# Patient Record
Sex: FEMALE | Race: WHITE | NOT HISPANIC OR LATINO | URBAN - METROPOLITAN AREA
[De-identification: names, ages, dates, MRNs, and addresses within clinical notes are randomized per-mention and may not be internally consistent; named-entity substitution may affect disease eponyms.]

---

## 2023-09-14 ENCOUNTER — TELEPHONE (OUTPATIENT)
Dept: GASTROENTEROLOGY | Facility: CLINIC | Age: 43
End: 2023-09-14

## 2023-09-14 ENCOUNTER — OFFICE VISIT (OUTPATIENT)
Dept: GASTROENTEROLOGY | Facility: CLINIC | Age: 43
End: 2023-09-14
Payer: COMMERCIAL

## 2023-09-14 VITALS
HEIGHT: 64 IN | WEIGHT: 190 LBS | DIASTOLIC BLOOD PRESSURE: 80 MMHG | BODY MASS INDEX: 32.44 KG/M2 | SYSTOLIC BLOOD PRESSURE: 120 MMHG

## 2023-09-14 DIAGNOSIS — K21.9 GASTROESOPHAGEAL REFLUX DISEASE WITHOUT ESOPHAGITIS: ICD-10-CM

## 2023-09-14 DIAGNOSIS — Z12.11 COLON CANCER SCREENING: Primary | ICD-10-CM

## 2023-09-14 DIAGNOSIS — K64.2 PROLAPSED INTERNAL HEMORRHOIDS, GRADE 3: ICD-10-CM

## 2023-09-14 PROCEDURE — 99203 OFFICE O/P NEW LOW 30 MIN: CPT | Performed by: INTERNAL MEDICINE

## 2023-09-14 RX ORDER — HYDROCORTISONE 25 MG/G
CREAM TOPICAL DAILY
Qty: 28 G | Refills: 2 | Status: SHIPPED | OUTPATIENT
Start: 2023-09-14 | End: 2023-09-28

## 2023-09-14 RX ORDER — MONTELUKAST SODIUM 10 MG/1
10 TABLET ORAL DAILY
COMMUNITY
Start: 2023-06-19

## 2023-09-14 RX ORDER — FAMOTIDINE 20 MG/1
20 TABLET, FILM COATED ORAL 2 TIMES DAILY
COMMUNITY

## 2023-09-14 RX ORDER — ALBUTEROL SULFATE 90 UG/1
2 AEROSOL, METERED RESPIRATORY (INHALATION) EVERY 4 HOURS PRN
COMMUNITY
Start: 2023-08-28

## 2023-09-14 RX ORDER — BUDESONIDE 180 UG/1
2 AEROSOL, POWDER RESPIRATORY (INHALATION) 2 TIMES DAILY
COMMUNITY
Start: 2023-08-29

## 2023-09-14 RX ORDER — HYDROCORTISONE ACETATE 25 MG/1
25 SUPPOSITORY RECTAL 2 TIMES DAILY
Qty: 12 SUPPOSITORY | Refills: 0 | Status: SHIPPED | OUTPATIENT
Start: 2023-09-14 | End: 2023-09-14

## 2023-09-14 RX ORDER — BUDESONIDE AND FORMOTEROL FUMARATE DIHYDRATE 160; 4.5 UG/1; UG/1
2 AEROSOL RESPIRATORY (INHALATION) 2 TIMES DAILY
COMMUNITY
Start: 2023-08-02

## 2023-09-14 NOTE — TELEPHONE ENCOUNTER
Scheduled date of colonoscopy (as of today):10/18  Physician performing colonoscopy: Dr. Madison Peres  Location of colonoscopy: [address] [place] [address]  Bowel prep reviewed with patient: Clenpiq  Instructions reviewed with patient by: gage jarvis  Clearances: none

## 2023-09-14 NOTE — PATIENT INSTRUCTIONS
[facility] - Outpatient Consultation  Anastasiia Pierre 38 y.o. female MRN: [id number]  Encounter: [id number]    ASSESSMENT AND PLAN:      1. Colon cancer screening  --Patient average risk for colorectal cancer. No major symptoms. Desires to start with screening at age 37. We will schedule colonoscopy near future. Risk were reviewed with the patient  - Colonoscopy; Future    2. Gastroesophageal reflux disease without esophagitis  --Patient with GERD. She did have upper endoscopy several years back and I attended to the patient. No significant esophagitis. Symptoms under fairly good control with just famotidine 20 mg once a day-takes in the evening  -Altered her diet and lifestyle as well with improvement    3. Prolapsed internal hemorrhoids, grade 3  --Hemorrhoids noted on physical exam.  Does have 1 that is about  third degree. Probably would benefit from hemorrhoid banding  -Prescribe Anusol cream.  Patient does seem to take enough fiber. We will give her a pamphlet about scheduling hemorrhoid banding.   Could consider after colonoscopy        Followup Appointment: Pending Colonoscopy

## 2023-09-14 NOTE — PROGRESS NOTES
[address] [place] [address] [facility] - Outpatient Consultation  Lisa Gresham 38 y.o. female MRN: [id number]  Encounter: [id number]    ASSESSMENT AND PLAN:      1. Colon cancer screening  --Patient average risk for colorectal cancer. No major symptoms. Desires to start with screening at age 37. We will schedule colonoscopy near future. Risk were reviewed with the patient  - Colonoscopy; Future    2. Gastroesophageal reflux disease without esophagitis  --Patient with GERD. She did have upper endoscopy several years back and I attended to the patient. No significant esophagitis. Symptoms under fairly good control with just famotidine 20 mg once a day-takes in the evening  -Altered her diet and lifestyle as well with improvement    3. Prolapsed internal hemorrhoids, grade 3  --Hemorrhoids noted on physical exam.  Does have 1 that is about third-degree. Probably would benefit from hemorrhoid banding  -Prescribe Anusol cream.  Patient does seem to take enough fiber. We will give her a pamphlet about scheduling hemorrhoid banding. Could consider after colonoscopy        Followup Appointment: Pending Colonoscopy  ______________________________________________________________________    Chief Complaint   Patient presents with   • Consult     colon     Patient referred for evaluation for colonoscopy by Dr. Simba Brown her PCP    HPI:   Lisa Gresham is a 38y.o. year old female who presents visit schedule for colorectal cancer screening. Patient denies any major change in bowel habit or rectal bleeding. There is no family history of colorectal cancer or polyps. She will be turning 43 next month. Patient does report some problems with hemorrhoids. She will have a normal bowel movement in the morning but then have cleanse herself subsequently. She had hemorrhoids earlier in the life were aggravated by her pregnancy. Patient does have a history of GERD.   She had an upper endoscopy several years back which I performed. There is no significant esophagitis. She was placed on PPI and famotidine. She is altering her lifestyle. She has stopped drinking completely and this is helped the situation. Symptoms are well controlled just on famotidine 20 mg daily    Historical Information   Past Medical History:   Diagnosis Date   • Anemia    • Asthma    • GERD (gastroesophageal reflux disease)    • Vitamin D deficiency      Past Surgical History:   Procedure Laterality Date   • EGD     • UTERINE FIBROID SURGERY       Social History     Substance and Sexual Activity   Alcohol Use Not Currently     Social History     Substance and Sexual Activity   Drug Use Never     Social History     Tobacco Use   Smoking Status Never   Smokeless Tobacco Never     Family History   Problem Relation Age of Onset   • Hypertension Mother    • Hyperlipidemia Mother    • Rheum arthritis Mother    • Heart disease Father    • Hypertension Father    • Hyperlipidemia Father        Meds/Allergies     Current Outpatient Medications:   •  albuterol (PROVENTIL HFA,VENTOLIN HFA) 90 mcg/act inhaler  •  famotidine (PEPCID) 20 mg tablet  •  montelukast (SINGULAIR) 10 mg tablet  •  Pulmicort Flexhaler 180 MCG/ACT inhaler  •  Symbicort 160-4.5 MCG/ACT inhaler    Allergies   Allergen Reactions   • Bactrim [Sulfamethoxazole-Trimethoprim] Itching   • Ciprofloxacin Itching     And flushed  And flushed     • Lamictal [Lamotrigine] Itching       PHYSICAL EXAM:    Blood pressure 120/80, height 5' 4" (1.626 m), weight 86.2 kg (190 lb). Body mass index is 32.61 kg/m². General Appearance: NAD, cooperative, alert  Eyes: Anicteric, conjunctiva pink  ENT:  Normocephalic, atraumatic, normal mucosa. Neck:  Supple, symmetrical, trachea midline,   Resp:  Clear to auscultation bilaterally; no rales, rhonchi or wheezing; respirations unlabored   CV:  S1 S2, Regular rate and rhythm; no murmur, rub, or gallop.   GI:  Soft, non-tender, non-distended; normal bowel sounds; no masses, no organomegaly   Rectal: No mass or tenderness. 1 prolapsed hemorrhoid third-degree internal  Musculoskeletal: No cyanosis, clubbing or edema. Normal ROM. Skin:  No jaundice, rashes, or lesions   Heme/Lymph: No palpable cervical lymphadenopathy  Psych: Normal affect, good eye contact  Neuro: No gross deficits, AAOx3          REVIEW OF SYSTEMS:    CONSTITUTIONAL: Denies any fever, chills, rigors, and weight loss. HEENT: No earache or tinnitus. Denies hearing loss or visual disturbances. CARDIOVASCULAR: No chest pain or palpitations. RESPIRATORY: Denies any cough, hemoptysis, shortness of breath or dyspnea on exertion. GASTROINTESTINAL: As noted in the History of Present Illness. GENITOURINARY: No problems with urination. Denies any hematuria or dysuria. NEUROLOGIC: No dizziness or vertigo, denies headaches. MUSCULOSKELETAL: Denies any muscle or joint pain. SKIN: Denies skin rashes or itching. ENDOCRINE: Denies excessive thirst. Denies intolerance to heat or cold. PSYCHOSOCIAL: Denies depression or anxiety. Denies any recent memory loss.

## 2023-09-15 ENCOUNTER — TELEPHONE (OUTPATIENT)
Dept: GASTROENTEROLOGY | Facility: CLINIC | Age: 43
End: 2023-09-15

## 2023-09-15 NOTE — TELEPHONE ENCOUNTER
Pt called to r/s procedure from 10/18 to 12/5 w/ Dr. Padmini Boswell at [facility]. Pt has prep and instructions. Pt also would like to know if pt should wait to have Hemorid procedure taken care of prior to colonoscopy or after?  Pt can be reached at [phone]

## 2023-11-21 ENCOUNTER — ANESTHESIA EVENT (OUTPATIENT)
Dept: ANESTHESIOLOGY | Facility: AMBULATORY SURGERY CENTER | Age: 43
End: 2023-11-21

## 2023-11-21 ENCOUNTER — ANESTHESIA (OUTPATIENT)
Dept: ANESTHESIOLOGY | Facility: AMBULATORY SURGERY CENTER | Age: 43
End: 2023-11-21

## 2023-11-27 ENCOUNTER — TELEPHONE (OUTPATIENT)
Dept: GASTROENTEROLOGY | Facility: CLINIC | Age: 43
End: 2023-11-27

## 2023-11-27 ENCOUNTER — TELEPHONE (OUTPATIENT)
Age: 43
End: 2023-11-27

## 2023-11-27 DIAGNOSIS — Z12.11 COLON CANCER SCREENING: Primary | ICD-10-CM

## 2023-11-29 RX ORDER — SODIUM PICOSULFATE, MAGNESIUM OXIDE, AND ANHYDROUS CITRIC ACID 12; 3.5; 1 G/175ML; G/175ML; MG/175ML
175 LIQUID ORAL 2 TIMES DAILY
Qty: 350 ML | Refills: 0 | Status: SHIPPED | OUTPATIENT
Start: 2023-11-29 | End: 2023-12-05 | Stop reason: HOSPADM

## 2023-12-05 ENCOUNTER — ANESTHESIA (OUTPATIENT)
Dept: GASTROENTEROLOGY | Facility: AMBULATORY SURGERY CENTER | Age: 43
End: 2023-12-05

## 2023-12-05 ENCOUNTER — ANESTHESIA EVENT (OUTPATIENT)
Dept: GASTROENTEROLOGY | Facility: AMBULATORY SURGERY CENTER | Age: 43
End: 2023-12-05

## 2023-12-05 ENCOUNTER — HOSPITAL ENCOUNTER (OUTPATIENT)
Dept: GASTROENTEROLOGY | Facility: AMBULATORY SURGERY CENTER | Age: 43
Discharge: HOME | End: 2023-12-05
Payer: COMMERCIAL

## 2023-12-05 VITALS
RESPIRATION RATE: 10 BRPM | TEMPERATURE: 98.4 F | HEIGHT: 60 IN | BODY MASS INDEX: 37.3 KG/M2 | OXYGEN SATURATION: 95 % | HEART RATE: 67 BPM | SYSTOLIC BLOOD PRESSURE: 120 MMHG | WEIGHT: 190 LBS | DIASTOLIC BLOOD PRESSURE: 67 MMHG

## 2023-12-05 DIAGNOSIS — Z12.11 COLON CANCER SCREENING: ICD-10-CM

## 2023-12-05 PROBLEM — F32.A DEPRESSION: Status: ACTIVE | Noted: 2023-12-05

## 2023-12-05 PROBLEM — D64.9 ANEMIA: Status: ACTIVE | Noted: 2023-12-05

## 2023-12-05 PROBLEM — F41.9 ANXIETY: Status: ACTIVE | Noted: 2023-12-05

## 2023-12-05 PROBLEM — J45.909 ASTHMA: Status: ACTIVE | Noted: 2023-12-05

## 2023-12-05 PROCEDURE — 45380 COLONOSCOPY AND BIOPSY: CPT | Performed by: INTERNAL MEDICINE

## 2023-12-05 PROCEDURE — 88305 TISSUE EXAM BY PATHOLOGIST: CPT | Performed by: STUDENT IN AN ORGANIZED HEALTH CARE EDUCATION/TRAINING PROGRAM

## 2023-12-05 RX ORDER — RABEPRAZOLE SODIUM 20 MG/1
20 TABLET, DELAYED RELEASE ORAL DAILY
COMMUNITY

## 2023-12-05 RX ORDER — PROPOFOL 10 MG/ML
INJECTION, EMULSION INTRAVENOUS AS NEEDED
Status: DISCONTINUED | OUTPATIENT
Start: 2023-12-05 | End: 2023-12-05

## 2023-12-05 RX ORDER — LIDOCAINE HYDROCHLORIDE 10 MG/ML
INJECTION, SOLUTION EPIDURAL; INFILTRATION; INTRACAUDAL; PERINEURAL AS NEEDED
Status: DISCONTINUED | OUTPATIENT
Start: 2023-12-05 | End: 2023-12-05

## 2023-12-05 RX ORDER — SODIUM CHLORIDE, SODIUM LACTATE, POTASSIUM CHLORIDE, CALCIUM CHLORIDE 600; 310; 30; 20 MG/100ML; MG/100ML; MG/100ML; MG/100ML
50 INJECTION, SOLUTION INTRAVENOUS CONTINUOUS
Status: DISCONTINUED | OUTPATIENT
Start: 2023-12-05 | End: 2023-12-09 | Stop reason: HOSPADM

## 2023-12-05 RX ADMIN — PROPOFOL 50 MG: 10 INJECTION, EMULSION INTRAVENOUS at 12:43

## 2023-12-05 RX ADMIN — PROPOFOL 30 MG: 10 INJECTION, EMULSION INTRAVENOUS at 12:34

## 2023-12-05 RX ADMIN — PROPOFOL 50 MG: 10 INJECTION, EMULSION INTRAVENOUS at 12:25

## 2023-12-05 RX ADMIN — PROPOFOL 50 MG: 10 INJECTION, EMULSION INTRAVENOUS at 12:48

## 2023-12-05 RX ADMIN — PROPOFOL 50 MG: 10 INJECTION, EMULSION INTRAVENOUS at 12:46

## 2023-12-05 RX ADMIN — PROPOFOL 50 MG: 10 INJECTION, EMULSION INTRAVENOUS at 12:39

## 2023-12-05 RX ADMIN — PROPOFOL 30 MG: 10 INJECTION, EMULSION INTRAVENOUS at 12:53

## 2023-12-05 RX ADMIN — PROPOFOL 30 MG: 10 INJECTION, EMULSION INTRAVENOUS at 12:50

## 2023-12-05 RX ADMIN — SODIUM CHLORIDE, SODIUM LACTATE, POTASSIUM CHLORIDE, CALCIUM CHLORIDE 50 ML/HR: 600; 310; 30; 20 INJECTION, SOLUTION INTRAVENOUS at 11:27

## 2023-12-05 RX ADMIN — LIDOCAINE HYDROCHLORIDE 50 MG: 10 INJECTION, SOLUTION EPIDURAL; INFILTRATION; INTRACAUDAL; PERINEURAL at 12:20

## 2023-12-05 RX ADMIN — SODIUM CHLORIDE, SODIUM LACTATE, POTASSIUM CHLORIDE, CALCIUM CHLORIDE: 600; 310; 30; 20 INJECTION, SOLUTION INTRAVENOUS at 12:35

## 2023-12-05 RX ADMIN — PROPOFOL 20 MG: 10 INJECTION, EMULSION INTRAVENOUS at 12:36

## 2023-12-05 RX ADMIN — PROPOFOL 50 MG: 10 INJECTION, EMULSION INTRAVENOUS at 12:28

## 2023-12-05 RX ADMIN — PROPOFOL 50 MG: 10 INJECTION, EMULSION INTRAVENOUS at 12:31

## 2023-12-05 RX ADMIN — PROPOFOL 200 MG: 10 INJECTION, EMULSION INTRAVENOUS at 12:20

## 2023-12-05 NOTE — H&P
History and Physical - [facility]  Rama Felipe 37 y.o. female MRN: [id number]    HPI: Rama Felipe is a 37y.o. year old female who presents for screening l colonoscopy. She is average risk    REVIEW OF SYSTEMS: Per the HPI, and otherwise unremarkable.     Historical Information   Past Medical History:   Diagnosis Date    Anemia     Anxiety     Asthma     Attention deficit disorder     Auditory perceptual disorder     Awareness under anesthesia     during tonsillectomy    Depression     GERD (gastroesophageal reflux disease)     Vitamin D deficiency      Past Surgical History:   Procedure Laterality Date    EGD      TUBAL LIGATION      UTERINE FIBROID SURGERY       Social History   Social History     Substance and Sexual Activity   Alcohol Use Not Currently     Social History     Substance and Sexual Activity   Drug Use Never     Social History     Tobacco Use   Smoking Status Former    Types: Cigarettes    Quit date:     Years since quittin.9   Smokeless Tobacco Never     Family History   Problem Relation Age of Onset    Hypertension Mother     Hyperlipidemia Mother     Rheum arthritis Mother     Heart disease Father     Hypertension Father     Hyperlipidemia Father        Meds/Allergies       Current Outpatient Medications:     albuterol (PROVENTIL HFA,VENTOLIN HFA) 90 mcg/act inhaler    famotidine (PEPCID) 20 mg tablet    montelukast (SINGULAIR) 10 mg tablet    Pulmicort Flexhaler 180 MCG/ACT inhaler    RABEprazole (ACIPHEX) 20 MG tablet    sodium picosulfate, magnesium oxide, citric acid (Clenpiq) oral solution    hydrocortisone (ANUSOL-HC) 2.5 % rectal cream    Symbicort 160-4.5 MCG/ACT inhaler    Current Facility-Administered Medications:     lactated ringers infusion, 50 mL/hr, Intravenous, Continuous, 50 mL/hr at 23 1127    Allergies   Allergen Reactions    Bactrim [Sulfamethoxazole-Trimethoprim] Itching    Ciprofloxacin Itching     And flushed  And flushed      Lamictal [Lamotrigine] Itching       Objective     /66   Pulse 74   Temp 98.4 °F (36.9 °C) (Temporal)   Resp 20   Ht 5' (1.524 m)   Wt 86.2 kg (190 lb)   LMP 11/20/2023 Comment: Had tubal  SpO2 99%   BMI 37.11 kg/m²     PHYSICAL EXAM    Gen: NAD AAOx3  Head: Normocephalic, Atraumatic  CV: S1S2 RRR no m/r/g  CHEST: Clear b/l no c/r/w  ABD: soft, +BS NT/ND  EXT: no edema    ASSESSMENT/PLAN:  This is a 37y.o. year old female here for screening colonoscopy and she is stable and optimized for her procedure.

## 2023-12-05 NOTE — ANESTHESIA POSTPROCEDURE EVALUATION
Post-Op Assessment Note    CV Status:  Stable  Pain Score: 0    Pain management: adequate       Mental Status:  Alert and awake   Hydration Status:  Euvolemic   PONV Controlled:  None   Airway Patency:  Patent     Post Op Vitals Reviewed: Yes      Staff: Anesthesiologist, CRNA   Comments: report given to RN; XI; HEATHER              BP   108/65   Temp      Pulse  65   Resp   18   SpO2   95

## 2023-12-08 ENCOUNTER — TELEPHONE (OUTPATIENT)
Dept: GASTROENTEROLOGY | Facility: CLINIC | Age: 43
End: 2023-12-08

## 2023-12-08 PROCEDURE — 88305 TISSUE EXAM BY PATHOLOGIST: CPT | Performed by: STUDENT IN AN ORGANIZED HEALTH CARE EDUCATION/TRAINING PROGRAM

## 2023-12-08 NOTE — RESULT ENCOUNTER NOTE
I called the patient and reviewed path. 1 small adenoma. Recall colonoscopy for 7 years.   Please copy patient's PCP Dr. Faith Basilio

## 2023-12-08 NOTE — TELEPHONE ENCOUNTER
I called the patient and reviewed path. She did have 1 small adenoma. Did have fairly large hemorrhoids on office visit with 1 prolapse. Will arrange for banding. Can be done in the next few weeks or after the holidays. -please call patient to arrange appointment

## 2024-01-10 ENCOUNTER — OFFICE VISIT (OUTPATIENT)
Age: 44
End: 2024-01-10
Payer: COMMERCIAL

## 2024-01-10 VITALS
WEIGHT: 193.2 LBS | BODY MASS INDEX: 37.93 KG/M2 | SYSTOLIC BLOOD PRESSURE: 120 MMHG | HEIGHT: 60 IN | DIASTOLIC BLOOD PRESSURE: 74 MMHG

## 2024-01-10 DIAGNOSIS — K64.2 PROLAPSED INTERNAL HEMORRHOIDS, GRADE 3: Primary | ICD-10-CM

## 2024-01-10 PROCEDURE — 46221 LIGATION OF HEMORRHOID(S): CPT | Performed by: INTERNAL MEDICINE

## 2024-01-10 RX ORDER — VIT B COMPLEX 100 NO.2/HERBS 100 MG
1 TABLET ORAL DAILY
COMMUNITY

## 2024-01-10 RX ORDER — MULTIVITAMIN WITH IRON
TABLET ORAL
COMMUNITY

## 2024-01-10 RX ORDER — ASCORBIC ACID 250 MG
TABLET,CHEWABLE ORAL
COMMUNITY

## 2024-01-10 RX ORDER — MELATONIN 100 %
POWDER (GRAM) MISCELLANEOUS
COMMUNITY

## 2024-01-10 NOTE — PATIENT INSTRUCTIONS
[facility] - Hemorrhoid Banding Lizzie Cohen 43 y.o. female MRN: [id number]  Encounter: [id number]    ASSESSMENT AND PLAN:    The left lateral hemorrhoid area was banded today. The patient was instructed to avoid constipation and straining, and educated in appropriate fiber intake.     HPI: Lizzie Cohen is a 43 y.o. year old female who presents with leakage and discomfort due to hemorrhoids.     Previous treatments include: Local care and fiber  Bands were previously placed: None  Current Fiber intake: High-fiber diet  Complications of prior therapy include: None    The patient provided consent for banding  and was placed in the left lateral position  Rectal exam showed -grade 3 internal hemorrhoids mass or fissure  The O'Peng ligator was advanced and a band was applied without difficulty   Repeat rectal exam confirmed appropriate placement  The patient was discharged home after observation in the waiting area  The exam was chaperoned by  Kimber Morgan       Patient advised to rest today and perhaps take a warm bath or sitz bath.  May take Tylenol or Advil for rectal discomfort    Call for significant problems with bleeding fever chills or intolerable pain.  Call  Center [phone]    Repeat 2-3 weeks

## 2024-01-10 NOTE — PROGRESS NOTES
[facility] - Hemorrhoid Banding Lizzie Cohen 43 y.o. female MRN: [id number]  Encounter: [id number]    ASSESSMENT AND PLAN:    The left lateral hemorrhoid area was banded today. The patient was instructed to avoid constipation and straining, and educated in appropriate fiber intake.     HPI: Lizzie Cohen is a 43 y.o. year old female who presents with leakage and discomfort due to hemorrhoids.     Previous treatments include: Local care and fiber  Bands were previously placed: None  Current Fiber intake: High-fiber diet  Complications of prior therapy include: None    The patient provided consent for banding  and was placed in the left lateral position  Rectal exam showed -grade 3 internal hemorrhoids mass or fissure  The O'Peng ligator was advanced and a band was applied without difficulty   Repeat rectal exam confirmed appropriate placement  The patient was discharged home after observation in the waiting area  The exam was chaperoned by  Kimber Morgan       Patient advised to rest today and perhaps take a warm bath or sitz bath.  May take Tylenol or Advil for rectal discomfort    Call for significant problems with bleeding fever chills or intolerable pain.  Call  Center [phone]    Repeat 2-3 weeks

## 2024-01-11 ENCOUNTER — NURSE TRIAGE (OUTPATIENT)
Age: 44
End: 2024-01-11

## 2024-01-11 NOTE — TELEPHONE ENCOUNTER
"Pt post banding 01/10/24 reports waking up to pain 7/10 this morning. She reports feeling fine at the office visit. Pt states \"I couldn't stop crying that's how bad it hurt\". Pt reports 2 loose BM today with moderate pain relief following second BM. Pt states further relief provided from meloxicam and Tylenol 500mg. Pt reports eating a high fiber diet and is avoiding solid foods to give her body time to heal. I suggested pt place towels on seating area in bathtub and have a sitz bath. If it is too painful to sit I suggested standing and allowing warm water to shower her backside. Pt is agreeable. Pt inquiring if post banding pain is normal.      Pt notes upcoming banding 01/24/26. She states this is when her menstrual cycle is expected and she typically becomes constipated during this time. Pt requesting if it would be ok to have existing band checked and move planned banding 2 weeks ahead.      Pt requests callback from office for further advise.      Reason for Disposition   Nursing judgment    Answer Assessment - Initial Assessment Questions  1. REASON FOR CALL or QUESTION: \"What is your reason for calling today?\" or \"How can I best help you?\" or \"What question do you have that I can help answer?\"      Pt post banding has follow up questions.    Protocols used: Information Only Call - No Triage-ADULT-[place]    "

## 2024-01-12 NOTE — TELEPHONE ENCOUNTER
I called the patient after viewing message - patient doing better this pm - some continued discomfort -but improved from am-  advised to continue warm soak or bath and tylenol -  Patient expect her menses in 2 weeks and has pain and cramping so wants to postpone repeat banding until mid February - will ask staff to reschedule - told patient band will fall off and no need for follow up for that in 2 weeks

## 2024-01-16 ENCOUNTER — NURSE TRIAGE (OUTPATIENT)
Age: 44
End: 2024-01-16

## 2024-01-16 NOTE — TELEPHONE ENCOUNTER
"Reason for Disposition   Information only question and nurse able to answer    Answer Assessment - Initial Assessment Questions  1. REASON FOR CALL or QUESTION: \"What is your reason for calling today?\" or \"How can I best help you?\" or \"What question do you have that I can help answer?\"          Pt. Called asking if she had activity restriction with having a banding procedure on 1/10/24, advised that she would be able to return to her normal activity , if any discomfort stop doing what she was doing but that she has no restrictions    Protocols used: Information Only Call - No Triage-ADULT-[place]    "

## 2024-03-27 ENCOUNTER — TELEPHONE (OUTPATIENT)
Age: 44
End: 2024-03-27

## 2024-03-27 NOTE — TELEPHONE ENCOUNTER
Patients GI provider:  Dr. Garg    Number to return call: [phone]    Reason for call: Pt calling to reschedule her banding appt on 04.03.24. My next appt with  is  not til 05.22.24. she is concerned about waiting but also says she is unsure about proceeding at all with another banding. She feels like the last banding she had made her symptoms worse and she is not sure if that is normal? She would like to know what the Dr would advise for her to do?     Scheduled procedure/appointment date if applicable: 04.03.24

## 2024-04-02 NOTE — TELEPHONE ENCOUNTER
I called the patient and reviewed symptoms.  I only seen her message today and this had not been in my inbox previously.  Patient had an appointment for 1030 tomorrow at [facility] for which is canceled.  Still open so I told the patient she could come in at that time.  Please do not book appointment.  Appointment could be for possible banding.  Will decide at the time of exam whether to proceed with the banding or not.

## 2024-04-03 ENCOUNTER — OFFICE VISIT (OUTPATIENT)
Age: 44
End: 2024-04-03
Payer: COMMERCIAL

## 2024-04-03 VITALS
WEIGHT: 193.4 LBS | BODY MASS INDEX: 33.02 KG/M2 | DIASTOLIC BLOOD PRESSURE: 82 MMHG | HEIGHT: 64 IN | SYSTOLIC BLOOD PRESSURE: 122 MMHG

## 2024-04-03 DIAGNOSIS — K64.2 PROLAPSED INTERNAL HEMORRHOIDS, GRADE 3: Primary | ICD-10-CM

## 2024-04-03 DIAGNOSIS — Z12.11 SCREENING FOR COLON CANCER: ICD-10-CM

## 2024-04-03 DIAGNOSIS — R15.9 INCONTINENCE OF FECES, UNSPECIFIED FECAL INCONTINENCE TYPE: ICD-10-CM

## 2024-04-03 PROCEDURE — 99213 OFFICE O/P EST LOW 20 MIN: CPT | Performed by: INTERNAL MEDICINE

## 2024-04-03 RX ORDER — VITAMINS A AND D OINTMENT 15.5; 53.4 G/100G; G/100G
OINTMENT TOPICAL
COMMUNITY

## 2024-04-03 NOTE — PATIENT INSTRUCTIONS
".[facility] - Outpatient Follow-up Note  Lizzie Cohen 43 y.o. female MRN: [id number]  Encounter: [id number]    ASSESSMENT AND PLAN:      1. Prolapsed internal hemorrhoids, grade 3  -- Patient with 1 prolapsed hemorrhoid.  Easily reducible.  We did place a band in the left lateral position in January.  Patient did have significant rectal pain for quite a while subsequently and almost went to the ER  -Anal tag noted on physical examination as well  -Could try a tablespoon of Metamucil with 8 ounces of water or juice or 2 FiberCon tablets.  Metamucil Gummies could be substituted  -Can try triple paste    2. Incontinence of feces, unspecified fecal incontinence type  -Does have occasional \"wet farts\" and mild leakage.  -This was the original reason for banding.  Probably will hold off on banding now.  Will see how the patient progresses and follow-up in 3 months.  -Could consider reattempt at banding again in the future if symptoms worsen    3. Screening for colon cancer  --Patient with colonoscopy December 2023.  She had 1 small adenoma.  Recall examination for 7 years from last exam      Followup Appointment: 3-4 mo   "

## 2024-04-03 NOTE — PROGRESS NOTES
".[facility] - Outpatient Follow-up Note  Lizzie Cohen 43 y.o. female MRN: [id number]  Encounter: [id number]    ASSESSMENT AND PLAN:      1. Prolapsed internal hemorrhoids, grade 3  -- Patient with 1 prolapsed hemorrhoid.  Easily reducible.  We did place a band in the left lateral position in January.  Patient did have significant rectal pain for quite a while subsequently and almost went to the ER  -Anal tag noted on physical examination as well  -Could try a tablespoon of Metamucil with 8 ounces of water or juice or 2 FiberCon tablets.  Metamucil Gummies could be substituted  -Can try triple paste    2. Incontinence of feces, unspecified fecal incontinence type  -Does have occasional \"wet farts\" and mild leakage.  -This was the original reason for banding.  Probably will hold off on banding now.  Will see how the patient progresses and follow-up in 3 months.  -Could consider reattempt at banding again in the future if symptoms worsen    3. Screening for colon cancer  --Patient with colonoscopy December 2023.  She had 1 small adenoma.  Recall examination for 7 years from last exam      Followup Appointment: 3-4 mo   ______________________________________________________________________    Chief Complaint   Patient presents with   • discuss banding     HPI: She returns to the office for follow-up visit back to anal discomfort and leakage.  Patient does at times have sense of incomplete evacuation.  Occasionally she will have a small of amount of liquid related to incontinence.  This is not all that frequent.  Patient underwent colonoscopy in December and had some mild diverticulosis and 1 polyp.  Physical examination did reveal a prolapsed hemorrhoid.  She was brought in for hemorrhoid banding and the band was placed left lateral position.  Unfortunately patient did have quite a bit of pain subsequently and rated a 7 out of 10.  She almost was at the point of going to the ED.  " She want to know if she had further banding procedures.  She has not had rectal bleeding.  She does have some external tags and 1 more large hemorrhoid which was the one we banded.  It is still present.  Patient does appear to have some increased sensitivities.  Patient had a fairly uncomplicated colonoscopy but had a lot of trapped gas and discomfort post procedure and then had a fairly strong reaction to having hemorrhoid banding.    Historical Information   Past Medical History:   Diagnosis Date   • Anemia    • Anxiety    • Asthma    • Attention deficit disorder    • Auditory perceptual disorder    • Awareness under anesthesia     during tonsillectomy   • Depression    • GERD (gastroesophageal reflux disease)    • Irritable bowel syndrome     Since adolescence   • Vitamin D deficiency      Past Surgical History:   Procedure Laterality Date   • COLONOSCOPY      W dr moore   • EGD     • TUBAL LIGATION     • UPPER GASTROINTESTINAL ENDOSCOPY      W dr moore   • UTERINE FIBROID SURGERY       Social History     Substance and Sexual Activity   Alcohol Use Not Currently    Comment: I had a Yessi on [place]. Caused bloating and headache.     Social History     Substance and Sexual Activity   Drug Use Never     Social History     Tobacco Use   Smoking Status Former   • Current packs/day: 0.00   • Average packs/day: 0.5 packs/day for 7.0 years (3.5 ttl pk-yrs)   • Types: Cigarettes   • Quit date: 2003   • Years since quittin.1   Smokeless Tobacco Never     Family History   Problem Relation Age of Onset   • Hypertension Mother    • Hyperlipidemia Mother    • Rheum arthritis Mother    • Depression Mother    • Heart disease Father    • Hypertension Father    • Hyperlipidemia Father    • Breast cancer Maternal Grandmother    • Stroke Paternal Grandmother    • ADD / ADHD Brother         I don’t think he knows. But it’s kind of obvious   • Asthma Paternal Uncle          Current Outpatient Medications:   •  A&D OINT  •  " albuterol (PROVENTIL HFA,VENTOLIN HFA) 90 mcg/act inhaler  •  Ascorbic Acid (Vitamin C) 250 MG CHEW  •  B Complex-Folic Acid (SM Balanced B-100) TABS  •  cholecalciferol 1,000 units tablet  •  famotidine (PEPCID) 20 mg tablet  •  Fe Bisgly-Vit C-Vit B12-FA (GENTLE IRON PO)  •  Magnesium 250 MG TABS  •  Melatonin POWD  •  montelukast (SINGULAIR) 10 mg tablet  •  Pulmicort Flexhaler 180 MCG/ACT inhaler  •  RABEprazole (ACIPHEX) 20 MG tablet  •  Symbicort 160-4.5 MCG/ACT inhaler  Allergies   Allergen Reactions   • Ciprofloxacin Itching     And flushed  And flushed     • Lamictal [Lamotrigine] Itching   • Sulfamethoxazole-Trimethoprim Itching and Hives     And flushed     Reviewed medications and allergies and updated as indicated    PHYSICAL EXAM:    Blood pressure 122/82, height 5' 3.5\" (1.613 m), weight 87.7 kg (193 lb 6.4 oz). Body mass index is 33.72 kg/m².  General Appearance: NAD, cooperative, alert  Eyes: Anicteric, conjunctiva pink  ENT:  Normocephalic, atraumatic, normal mucosa.    Neck:  Supple, symmetrical, trachea midline  Resp:  Clear to auscultation bilaterally; no rales, rhonchi or wheezing; respirations unlabored   CV:  S1 S2, Regular rate and rhythm; no murmur, rub, or gallop.  GI:  Soft, non-tender, non-distended; normal bowel sounds; no masses, no organomegaly   Rectal: 1 prolapsed hemorrhoid easily reduced.  Positive anal skin tags.  No masses or tenderness no fissure  Musculoskeletal: No cyanosis, clubbing or edema. Normal ROM.  Skin:  No jaundice, rashes, or lesions   Heme/Lymph: No palpable cervical lymphadenopathy  Psych: Normal affect, good eye contact  Neuro: No gross deficits, AAOx3      "

## 2024-07-15 ENCOUNTER — TELEPHONE (OUTPATIENT)
Age: 44
End: 2024-07-15

## 2024-07-15 ENCOUNTER — TELEPHONE (OUTPATIENT)
Dept: GASTROENTEROLOGY | Facility: CLINIC | Age: 44
End: 2024-07-15

## 2024-07-15 DIAGNOSIS — K64.8 PROLAPSED INTERNAL HEMORRHOIDS: Primary | ICD-10-CM

## 2024-07-15 NOTE — TELEPHONE ENCOUNTER
Patients GI provider:  Dr. Garg    Number to return call: [phone]    Reason for call: Pt is requesting a call back to go over details regarding 7/17/2024 3 month follow up. Patient is asking if pt will be examined during the visit and has a few more questions regarding OV. Pt can be reached at the number above, thank you.     Scheduled procedure/appointment date if applicable: Apt/procedure 7/17/24

## 2024-07-16 ENCOUNTER — NURSE TRIAGE (OUTPATIENT)
Age: 44
End: 2024-07-16

## 2024-07-16 NOTE — TELEPHONE ENCOUNTER
Pt returning call, per referral notes was called to schedule with [facility] for hemorrhoids. Appt scheduled with [facility] for 7/29

## 2024-07-16 NOTE — TELEPHONE ENCOUNTER
I called the patient with response to her call.  She has an appointment with me in 2 days.  We did a hemorrhoid banding on her in January.  Patient did have a lot of difficulty subsequently and significant pain.  Seems to have less bowel problems when she avoids coffee and has stopped taking supplemental magnesium.   Patient with some significant sensitivity in the area.  I told patient and may be reasonable for her to see Dr. Schuler colorectal surgery and she agrees.  Will put a consult in to facilitate.  Asked staff to call patient tomorrow to give phone number for appointment for Dr. Schuler if it is not the call [phone]  Can cancel appointment for 7/17 with me

## 2024-07-16 NOTE — TELEPHONE ENCOUNTER
7/16/24 - Spoke to pt. Gave Dr. Schuler's number and told patient that appt with Dr. Garg was cancelled. WLD

## 2024-07-24 NOTE — PROGRESS NOTES
Ambulatory Visit  Name: Lizzie Cohen      : 1980      MRN: [id number]  Encounter Provider: Antionette Schuler MD  Encounter Date: 2024   Encounter department: [facility]    Assessment & Plan   1. Fecal smearing  2. Prolapsed internal hemorrhoids  -     Ambulatory Referral to Colorectal Surgery  Discussed the importance of stool bulking with fiber supplementation and adequate hydration in order to prevent symptoms of fecal smearing  In terms of external irritation, I recommended topical Calmoseptine to be used as a barrier ointment and cessation of all topical steroids  She does have small internal hemorrhoids present on exam today  Since she did not tolerate rubber banding well, I offered sclerotherapy should symptoms of internal hemorrhoids worsen or recur  She may follow-up as needed    History of Present Illness     Lizzie Cohen is a 43 y.o. female who presents for a second opinion for hemorrhoids, she had a banding by Dr. Garg in 2024 but has discomfort after, she reports some blood upon wiping after a bowel movement, she did notice some stool seepage after bowel movements for the past years.    She does feel external lumps for years that are not giving her any issues.  She reports mild irritation when she is cycling.    She reports 1 bowel movement daily that varies from hard to soft.  She takes fiber supplements intermittently.  She has stopped drinking coffee in the afternoon and has stopped her magnesium supplements.  She feels that symptoms of leakage have slightly improved after these measures.    Her last colonoscopy was 2023, the pathology revealed TA x 1 and HP x 1 with a 7 year recall.    Review of Systems   Constitutional:  Negative for chills and fever.   HENT:  Negative for ear pain and sore throat.    Eyes:  Negative for pain and visual disturbance.   Respiratory:  Negative for cough and shortness of breath.    Cardiovascular:   Negative for chest pain and palpitations.   Gastrointestinal:  Negative for abdominal pain and vomiting.   Genitourinary:  Negative for dysuria and hematuria.   Musculoskeletal:  Negative for arthralgias and back pain.   Skin:  Negative for color change and rash.   Neurological:  Negative for seizures and syncope.   All other systems reviewed and are negative.    Past Medical History   Past Medical History:   Diagnosis Date    Anemia     Anxiety     Asthma     Attention deficit disorder     Auditory perceptual disorder     Awareness under anesthesia     during tonsillectomy    Depression     GERD (gastroesophageal reflux disease)     Irritable bowel syndrome     Since adolescence    Vitamin D deficiency      Past Surgical History:   Procedure Laterality Date    COLONOSCOPY      W dr moore    EGD      TUBAL LIGATION      UPPER GASTROINTESTINAL ENDOSCOPY      W dr moore    UTERINE FIBROID SURGERY       Family History   Problem Relation Age of Onset    Hypertension Mother     Hyperlipidemia Mother     Rheum arthritis Mother     Depression Mother     Heart disease Father     Hypertension Father     Hyperlipidemia Father     Breast cancer Maternal Grandmother     Stroke Paternal Grandmother     ADD / ADHD Brother         I don’t think he knows. But it’s kind of obvious    Asthma Paternal Uncle      Current Outpatient Medications on File Prior to Visit   Medication Sig Dispense Refill    A&D OINT Apply topically      albuterol (PROVENTIL HFA,VENTOLIN HFA) 90 mcg/act inhaler Inhale 2 puffs every 4 (four) hours as needed      Ascorbic Acid (Vitamin C) 250 MG CHEW Vitamin C      B Complex-Folic Acid (SM Balanced B-100) TABS Take 1 tablet by mouth daily      cholecalciferol 1,000 units tablet Take 5,000 Units by mouth daily      famotidine (PEPCID) 20 mg tablet Take 20 mg by mouth daily      Fe Bisgly-Vit C-Vit B12-FA (GENTLE IRON PO) Take by mouth      Melatonin POWD Take by mouth      montelukast (SINGULAIR) 10 mg tablet  Take 10 mg by mouth daily      Pulmicort Flexhaler 180 MCG/ACT inhaler Inhale 2 puffs 2 (two) times a day      RABEprazole (ACIPHEX) 20 MG tablet Take 20 mg by mouth daily      Symbicort 160-4.5 MCG/ACT inhaler Inhale 2 puffs 2 (two) times a day When in a flare up switches from pulmicort      Magnesium 250 MG TABS Iron (Patient not taking: Reported on 7/29/2024)       No current facility-administered medications on file prior to visit.     Allergies   Allergen Reactions    Ciprofloxacin Itching     And flushed  And flushed      Lamictal [Lamotrigine] Itching    Sulfamethoxazole-Trimethoprim Itching and Hives     And flushed      Objective     There were no vitals taken for this visit.    Physical Exam  Vitals and nursing note reviewed.   Constitutional:       General: She is not in acute distress.     Appearance: She is well-developed.   HENT:      Head: Normocephalic and atraumatic.   Eyes:      Conjunctiva/sclera: Conjunctivae normal.   Cardiovascular:      Rate and Rhythm: Normal rate and regular rhythm.      Heart sounds: No murmur heard.  Pulmonary:      Effort: Pulmonary effort is normal. No respiratory distress.      Breath sounds: Normal breath sounds.   Abdominal:      Palpations: Abdomen is soft.      Tenderness: There is no abdominal tenderness.   Genitourinary:     Comments: Small anterior external hemorrhoids without evidence of thrombosis  Strong tone on digital anorectal exam  Anoscopy showing small 3 quadrant internal hemorrhoids without evidence of recent bleeding  Musculoskeletal:         General: No swelling.      Cervical back: Neck supple.   Skin:     General: Skin is warm and dry.      Capillary Refill: Capillary refill takes less than 2 seconds.   Neurological:      Mental Status: She is alert.   Psychiatric:         Mood and Affect: Mood normal.     Lower Endoscopy    Date/Time: 7/29/2024 8:20 AM    Performed by: Antionette Schuler MD  Authorized by: Antionette Schuler MD     Verbal consent obtained?: Yes    Risks and benefits: Risks, benefits and alternatives were discussed    Consent given by:  Patient  Patient identity confirmed:  Verbally with patient and provided demographic data  Time out: Immediately prior to the procedure a time out was called    Patient sedated: No    Scope type:  Anoscope  External exam performed: Yes    Digital exam performed: Yes    Internal hemorrhoids: Yes        Administrative Statements   I have spent a total time of 32 minutes in caring for this patient on the day of the visit/encounter including Diagnostic results, Prognosis, Risks and benefits of tx options, Instructions for management, Patient and family education, Importance of tx compliance, Risk factor reductions, Impressions, Counseling / Coordination of care, Documenting in the medical record, Reviewing / ordering tests, medicine, procedures  , Obtaining or reviewing history  , and Communicating with other healthcare professionals .

## 2024-07-29 ENCOUNTER — OFFICE VISIT (OUTPATIENT)
Age: 44
End: 2024-07-29
Payer: COMMERCIAL

## 2024-07-29 VITALS — HEIGHT: 64 IN | BODY MASS INDEX: 32.44 KG/M2 | WEIGHT: 190 LBS

## 2024-07-29 DIAGNOSIS — K64.8 PROLAPSED INTERNAL HEMORRHOIDS: ICD-10-CM

## 2024-07-29 DIAGNOSIS — R15.1 FECAL SMEARING: Primary | ICD-10-CM

## 2024-07-29 PROCEDURE — 46600 DIAGNOSTIC ANOSCOPY SPX: CPT | Performed by: SURGERY

## 2024-07-29 PROCEDURE — 99203 OFFICE O/P NEW LOW 30 MIN: CPT | Performed by: SURGERY

## 2024-07-29 NOTE — PATIENT INSTRUCTIONS
Take metamucil or fiber supplements daily    Avoid overcleaning; after a bowel movement, clean the area with only water.    Avoid soaps, detergents, and lotions around your bottom.    You may use Calmoseptine or Aquaphor as a barrier ointment.    Avoid foods that are high in acidity: caffeine, chocolate, alcohol, citrus, spicy foods, and tomato.

## 2025-01-21 ENCOUNTER — OFFICE VISIT (OUTPATIENT)
Dept: GASTROENTEROLOGY | Facility: CLINIC | Age: 45
End: 2025-01-21
Payer: COMMERCIAL

## 2025-01-21 VITALS
SYSTOLIC BLOOD PRESSURE: 132 MMHG | DIASTOLIC BLOOD PRESSURE: 72 MMHG | WEIGHT: 177 LBS | HEIGHT: 64 IN | BODY MASS INDEX: 30.22 KG/M2

## 2025-01-21 DIAGNOSIS — K21.9 GASTROESOPHAGEAL REFLUX DISEASE WITHOUT ESOPHAGITIS: ICD-10-CM

## 2025-01-21 DIAGNOSIS — K64.8 INTERNAL HEMORRHOIDS: ICD-10-CM

## 2025-01-21 DIAGNOSIS — R10.10 UPPER ABDOMINAL PAIN: ICD-10-CM

## 2025-01-21 DIAGNOSIS — D12.6 COLON ADENOMA: Primary | ICD-10-CM

## 2025-01-21 PROCEDURE — 99214 OFFICE O/P EST MOD 30 MIN: CPT | Performed by: PHYSICIAN ASSISTANT

## 2025-01-21 RX ORDER — RABEPRAZOLE SODIUM 20 MG/1
20 TABLET, DELAYED RELEASE ORAL
Qty: 60 TABLET | Refills: 5 | Status: SHIPPED | OUTPATIENT
Start: 2025-01-21

## 2025-01-21 NOTE — ASSESSMENT & PLAN NOTE
See upper abdominal pain.  Orders:  •  RABEprazole (ACIPHEX) 20 MG tablet; Take 1 tablet (20 mg total) by mouth 2 (two) times a day before meals

## 2025-01-21 NOTE — PROGRESS NOTES
Name: Lizzie Cohen      : 1980      MRN: [id number]  Encounter Provider: Denia Carias PA-C  Encounter Date: 2025   Encounter department: [facility]  :  Assessment & Plan  Upper abdominal pain  In the last few weeks notes upper abdominal discomfort worse with sitting improved with eating, swelling and nausea.  Reflux has been symptomatic as well.  ER eval showed mild leukocytosis.  Patient declined CT scan.  Has mild upper abd tenderness without Dash sign. Encouraged to complete ultrasound as ordered by PCP to exclude hepatobiliary disease.  Could be a flare of reflux or MSK pain.  Advised to increase rabeprazole to 20 mg twice daily 30 minutes AC and continue famotidine 40 mg at bedtime.  Follow bland diet. Antireflux regiment lifestyle modifications were reviewed.  If symptoms do not improve consider repeat endoscopy.  EGD 2017 showed gastritis no evidence of Cardenas's.       Gastroesophageal reflux disease without esophagitis  See upper abdominal pain.  Orders:  •  RABEprazole (ACIPHEX) 20 MG tablet; Take 1 tablet (20 mg total) by mouth 2 (two) times a day before meals    Colon adenoma  Small adenoma on colonoscopy 2023 recommend repeat in 7 years, due 2030.  Reviewed with patient.       Internal hemorrhoids  Intermittent painless scant rectal bleeding, local care reviewed.  Patient unable to tolerate banding in the past.  Advised if severe symptoms to follow-up with colorectal surgery for possible sclerotherapy         Follow up OV 2m    History of Present Illness   HPI  Lizzie Cohen is a 44 y.o. female with PMH anxiety, depression, asthma being evaluated after ER eval for upper abd pain.     EGD 2017 for GERD and epigastric pain showed gastritis, gastric biopsy negative.    2023 colonoscopy with BBPS 6 showed mild diverticulosis, internal hemorrhoids.1 small adenoma recommend repeat in 7 years    Last eval by Dr. Garg 2024 with 1  "prolapsed hemorrhoid had banding in past local care recommended had fecal leakage advised to try fiber.  Referred to colorectal surgery    Saw colorectal surgery recommended fiber/ topical Calmoseptine,  small internal hemorrhoids on exam did not tolerate banding recommend sclerotherapy if internal hemorrhoid symptoms worsen    1/2025  [facility] ER for nausea/upper abd pain, offered CT but pt declined, pt discharged.    1/2025 CBC showed mild leukocytosis 11.6.  CMP amylase and lipase normal.    Reports 6 months ago had voice changes after being off PPI restarted and voice changes resolved.  Also taking famotidine 20 mg daily, uses NSAIDs twice a month.  In the last 2 weeks notes constant nonradiating upper abdominal discomfort/swelling that is worse with sitting and can improve with eating.  She has some nausea without vomiting.  Is experiencing reflux the last few days.  Reports she was x-ray at [facility]izing vigorously and has lost 17 pounds in the last 1 year but is eating well.  Admits to daily bowel movements without melena.  Has intermittent painless scant rectal bleeding but has not tried anything for this.  Reports PCP ordered abdominal ultrasound scheduled for 2/6.  Pepcid increased to 40 daily which she started yesterday.      Review of Systems  Constitutional: denies fatigue, fever.  HEENT: denies visual disturbance, postnasal drip, sore throat.  Respiratory: denies cough, shortness of breath.  Cardiovascular: denies chest pain, leg swelling.  Gastrointestinal: as noted above in HPI.  : denies difficulty urinating, dysuria.  Musculoskeletal: denies arthralgias, back pain.  Neurological: denies dizziness, syncope.  Psychiatric: denies confusion, anxiety.       Objective   /72   Ht 5' 3.5\" (1.613 m)   Wt 80.3 kg (177 lb)   BMI 30.86 kg/m²      Physical Exam  Constitutional: Well-developed, no acute distress  HEENT: normocephalic, mucous membranes moist.  Neck: Supple  Skin: warm and dry  Respiratory: Lungs " are clear to auscultation B/L.  Cardiovascular: Heart is regular rate and rhythm.  Gastrointestinal: Soft, mild upper abd tenderness, nondistended with normal active bowel sounds.  No masses, guarding, rebound.   Rectal Exam: Deferred.  Extremities: No edema.  Neurologic: Nonfocal. A & O ×3.   Psychiatric: Normal affect.

## 2025-02-24 ENCOUNTER — TELEPHONE (OUTPATIENT)
Age: 45
End: 2025-02-24

## 2025-02-24 NOTE — TELEPHONE ENCOUNTER
Patients GI provider:  JESSICA Carias     Number to return call: (: [phone]     Reason for call: Pt calling received an EOB for an endoscopic procedure and stating it was discussed but she did not have one done from her last OV visit in Jan. Had pt verify the DOS on the EOB. The DOS the patient provided 12/5/23, verified a colonoscopy was done on that date.     Scheduled procedure/appointment date if applicable: Appt 4/1/25

## 2025-04-01 ENCOUNTER — OFFICE VISIT (OUTPATIENT)
Dept: GASTROENTEROLOGY | Facility: CLINIC | Age: 45
End: 2025-04-01
Payer: COMMERCIAL

## 2025-04-01 VITALS
BODY MASS INDEX: 30.83 KG/M2 | WEIGHT: 174 LBS | SYSTOLIC BLOOD PRESSURE: 132 MMHG | DIASTOLIC BLOOD PRESSURE: 80 MMHG | HEIGHT: 63 IN

## 2025-04-01 DIAGNOSIS — K64.8 INTERNAL HEMORRHOIDS: ICD-10-CM

## 2025-04-01 DIAGNOSIS — D12.6 COLON ADENOMA: ICD-10-CM

## 2025-04-01 DIAGNOSIS — R10.10 UPPER ABDOMINAL PAIN: Primary | ICD-10-CM

## 2025-04-01 DIAGNOSIS — K21.9 GASTROESOPHAGEAL REFLUX DISEASE WITHOUT ESOPHAGITIS: ICD-10-CM

## 2025-04-01 PROCEDURE — 99214 OFFICE O/P EST MOD 30 MIN: CPT | Performed by: PHYSICIAN ASSISTANT

## 2025-04-01 NOTE — PATIENT INSTRUCTIONS
-antireflux diet  -take rabeprazole 20 mg daily, take 2nd dose prn  -cont famotidine 40 mg at bedtime  -local hemorrhoidal care  -1 year OV

## 2025-06-25 ENCOUNTER — TELEPHONE (OUTPATIENT)
Age: 45
End: 2025-06-25

## 2025-06-25 NOTE — TELEPHONE ENCOUNTER
Spoke with pt, currently on Augmentin for an eye infection. Reports abdominal discomfort and increased reflux symptoms. Pt is currently menstruating as well which exacerbates her symptoms including nausea. Pt takes Rabeprazole 20  mg and Pepcid 40 mg. Pt advised per last office note she can take 2nd dose of Rabeprazole prn, she will do this for the next 1-2 weeks. Any recommendations for nausea? Thank you.

## 2025-06-25 NOTE — TELEPHONE ENCOUNTER
Spoke with patient she clarified that she is actually experiencing more stomach pain and discomfort not nausea. She experiences nausea when she is menstruating.  Is there another medication you would recommended for the pain.

## 2025-07-30 ENCOUNTER — OB ABSTRACT (OUTPATIENT)
Age: 45
End: 2025-07-30

## 2025-07-31 ENCOUNTER — OFFICE VISIT (OUTPATIENT)
Age: 45
End: 2025-07-31
Payer: COMMERCIAL

## 2025-07-31 VITALS
DIASTOLIC BLOOD PRESSURE: 88 MMHG | HEIGHT: 63 IN | SYSTOLIC BLOOD PRESSURE: 120 MMHG | BODY MASS INDEX: 30.12 KG/M2 | WEIGHT: 170 LBS

## 2025-07-31 DIAGNOSIS — N89.8 VAGINAL DRYNESS: Primary | ICD-10-CM

## 2025-07-31 DIAGNOSIS — N95.1 MENOPAUSAL SYMPTOMS: ICD-10-CM

## 2025-07-31 PROCEDURE — 99213 OFFICE O/P EST LOW 20 MIN: CPT | Performed by: PHYSICIAN ASSISTANT

## 2025-07-31 RX ORDER — ESTRADIOL 0.1 MG/G
0.2 CREAM VAGINAL 2 TIMES WEEKLY
Qty: 42.5 G | Refills: 1 | Status: SHIPPED | OUTPATIENT
Start: 2025-07-31

## 2025-07-31 RX ORDER — CHLORAL HYDRATE 500 MG
1000 CAPSULE ORAL DAILY
COMMUNITY